# Patient Record
(demographics unavailable — no encounter records)

---

## 2025-01-24 NOTE — HISTORY OF PRESENT ILLNESS
[FreeTextEntry1] : 1/24/25 HPI: Joey is a 53 year old female presenting for Botox for migraine. Last injections 10/9/24 with Dr. Pina.

## 2025-01-24 NOTE — PROCEDURE
[FreeTextEntry1] : BOTOX 200 units; 5 units per muscle site. procerus x 1,  x 2, frontalis x 4, temporalis x 8, occipitalis x 6, cervical paraspinal x 4 and trapezius x 6. The areas to be injected were cleaned with alcohol swabs and allowed to dry prior to injection. An anesthetic spray of Ethyl Chloride ([employer]) was used to desensitize the skin prior to injections.  Botox Lot# , Exp. mixed with 4cc saline  per standard procedure indicated above with these exceptions:  None injected into bilateral corrugators.   Total Used: Total Wasted:  The patient tolerated the procedure without issues.

## 2025-06-11 NOTE — PROCEDURE
[FreeTextEntry1] : Botox [FreeTextEntry2] : Chronic Daily Headaches [FreeTextEntry4] : Ethyl Chloride [FreeTextEntry3] : Patient was consented with explanation of risks and benefits. headaches are well controlled. Doing better with increased dose of botox.   Patient was injected in the sitting position with ethyl chloride spray used before each injection.  Muscles injected: Procerus - 5 units  - 0 units in each side = 0 units Frontalis - 5 units in 5 injections on each side = 50 units Temporalis - 5 units in 5 injection in each side = 50 units Occipitalis - 5 units in 54  injections on each side = 40 units Splenius capitis - 5 units in 2 injections on each side = 20 Trapezius - 5 units in 3 injections on each side = 30  Total used 195 units Onabotulinum toxin Total wasted = 5 Total billable = 200 units  Patient tolerated procedure well with less pain. I have asked the patient to continue keeping track of headaches.  Pt has had great response to botox treatments- has been using her breakthrough medication only sparingly. Headaches x 1